# Patient Record
Sex: MALE | Race: BLACK OR AFRICAN AMERICAN | NOT HISPANIC OR LATINO | Employment: OTHER | URBAN - METROPOLITAN AREA
[De-identification: names, ages, dates, MRNs, and addresses within clinical notes are randomized per-mention and may not be internally consistent; named-entity substitution may affect disease eponyms.]

---

## 2024-06-20 ENCOUNTER — OFFICE VISIT (OUTPATIENT)
Dept: URGENT CARE | Facility: URGENT CARE | Age: 19
End: 2024-06-20
Payer: COMMERCIAL

## 2024-06-20 ENCOUNTER — ANCILLARY PROCEDURE (OUTPATIENT)
Dept: GENERAL RADIOLOGY | Facility: CLINIC | Age: 19
End: 2024-06-20
Attending: PHYSICIAN ASSISTANT
Payer: COMMERCIAL

## 2024-06-20 VITALS
WEIGHT: 262.8 LBS | HEART RATE: 97 BPM | DIASTOLIC BLOOD PRESSURE: 84 MMHG | TEMPERATURE: 97 F | SYSTOLIC BLOOD PRESSURE: 131 MMHG | OXYGEN SATURATION: 97 %

## 2024-06-20 DIAGNOSIS — R06.02 SHORTNESS OF BREATH: ICD-10-CM

## 2024-06-20 DIAGNOSIS — J22 LRTI (LOWER RESPIRATORY TRACT INFECTION): Primary | ICD-10-CM

## 2024-06-20 PROCEDURE — 99204 OFFICE O/P NEW MOD 45 MIN: CPT | Performed by: PHYSICIAN ASSISTANT

## 2024-06-20 PROCEDURE — 71046 X-RAY EXAM CHEST 2 VIEWS: CPT | Mod: TC | Performed by: RADIOLOGY

## 2024-06-20 RX ORDER — AZITHROMYCIN 250 MG/1
TABLET, FILM COATED ORAL
Qty: 6 TABLET | Refills: 0 | Status: SHIPPED | OUTPATIENT
Start: 2024-06-20 | End: 2024-06-25

## 2024-06-20 RX ORDER — PREDNISONE 20 MG/1
20 TABLET ORAL 2 TIMES DAILY
Qty: 10 TABLET | Refills: 0 | Status: SHIPPED | OUTPATIENT
Start: 2024-06-20 | End: 2024-06-25

## 2024-06-20 NOTE — LETTER
June 20, 2024      Colton Subarmanian Jr.  [address]   [facility] [id number]        To Whom It May Concern:    Colton Subramanian Jr.  was seen on 6/20/24.  Please excuse him for the next 1-3 days due to illness.        Sincerely,        Vijay Santo PA-C

## 2024-06-20 NOTE — PROGRESS NOTES
LRTI (lower respiratory tract infection)  - XR Chest 2 Views  - azithromycin (ZITHROMAX) 250 MG tablet; Take 2 tablets (500 mg) by mouth daily for 1 day, THEN 1 tablet (250 mg) daily for 4 days.  - predniSONE (DELTASONE) 20 MG tablet; Take 1 tablet (20 mg) by mouth 2 times daily for 5 days    Shortness of breath  - XR Chest 2 Views  - azithromycin (ZITHROMAX) 250 MG tablet; Take 2 tablets (500 mg) by mouth daily for 1 day, THEN 1 tablet (250 mg) daily for 4 days.  - predniSONE (DELTASONE) 20 MG tablet; Take 1 tablet (20 mg) by mouth 2 times daily for 5 days    General Tips for All Ages:    Rest:  Why: Allows your body to focus on healing.  What to Do:  Get plenty of rest and avoid overexertion.    Hydration:  Why: Helps thin mucus and soothes the respiratory tract.  What to Do:  Drink ample fluids, including water, herbal teas, and clear broths.    OTC Cough Suppressants (if recommended):  Why: Eases coughing and promotes rest.  What to Do:  Use over-the-counter cough suppressants as directed.    For Children (Under 12 Years):    OTC Pediatric Cough Medications (if approved by provider):  Why: Manages cough symptoms.  What to Do:  Administer pediatric cough medications as recommended by your child's pediatrician.    Humidifier Use:  Why: Adds moisture to the air, easing breathing.  What to Do:  Use a humidifier in your child's room, especially during sleep.    For Adolescents (12-17 Years):    OTC Cough Suppressants:  Why: Eases persistent coughing.  What to Do:  Take over-the-counter cough suppressants as directed.    Stay Active:  Why: Gentle exercise can aid in recovery.  What to Do:  Engage in light physical activity as tolerated.    For Adults (18 Years and Older):    OTC Cough Suppressants:  Why: Eases coughing for better rest.  What to Do:  Use over-the-counter cough suppressants as directed.    NSAIDs (Nonsteroidal Anti-Inflammatory Drugs):  Why: Reduces inflammation and alleviates discomfort.  What to  Do:  Take NSAIDs like ibuprofen as directed.    All Ages:    Warm Salt Gargle:  Why: Soothes a sore throat.  What to Do:  Gargle with warm salt water several times a day.    Deep Breathing Exercises:  Why: Helps clear mucus and improve lung function.  What to Do:  Practice deep breathing exercises regularly.    Nutrient-Rich Diet:  Why: Supports overall health and recovery.  What to Do:  Consume a balanced diet with fruits, vegetables, and protein.    Avoid Smoke and Irritants:  Why: Prevents respiratory irritation.  What to Do:  Steer clear of smoke, strong odors, and environmental irritants.    Follow-Up Care:    Patient advised to return to clinic for reevaluation (either UC or PCP) if symptoms do not improve in 5 days. Patient educated on red flag symptoms and asked to go directly to the ED if these symptoms present themselves.     Seek Medical Attention:    When: If symptoms persist or worsen.  What to Do:  Consult with your healthcare provider if your symptoms don't improve or if you experience difficulty breathing.  Remember, lower respiratory infections may take time to fully resolve. Follow these guidelines, and if you have any concerns or need personalized advice, don't hesitate to contact your healthcare provider.    Wishing you a speedy recovery!      Vijay Santo PA-C  [facility]    Subjective   18 year old who presents to clinic today for the following health issues:    Urgent Care       HPI     Acute Illness  Acute illness concerns: Pt presents with sob, burning sensation, wheezing, chills and cough, feels light headed when he gets up X couple weeks. Pt has a hx of pneumonia/was born with pneumonia.  Symptoms:  Fever: No  Chills/Sweats: No  Headache (location?): No  Sinus Pressure: No  Conjunctivitis:  No  Ear Pain: no  Rhinorrhea: YES  Congestion: YES  Sore Throat: YES  Cough: YES  Wheeze: YES  Decreased Appetite: YES  Nausea: YES  Vomiting: YES  Diarrhea: No  Dysuria/Freq.:  No  Dysuria or Hematuria: No  Fatigue/Achiness: YES  Sick/Strep Exposure: No      Review of Systems   Review of Systems   See HPI    Objective    Temp: 97  F (36.1  C) Temp src: Tympanic BP: 131/84 Pulse: 97     SpO2: 97 %       Physical Exam   Physical Exam  Constitutional:       General: He is not in acute distress.     Appearance: Normal appearance. He is normal weight. He is not ill-appearing, toxic-appearing or diaphoretic.   HENT:      Head: Normocephalic and atraumatic.      Right Ear: Tympanic membrane, ear canal and external ear normal. There is no impacted cerumen.      Left Ear: Tympanic membrane, ear canal and external ear normal. There is no impacted cerumen.      Nose: Congestion and rhinorrhea present.      Mouth/Throat:      Pharynx: No oropharyngeal exudate or posterior oropharyngeal erythema.   Cardiovascular:      Rate and Rhythm: Normal rate and regular rhythm.      Pulses: Normal pulses.      Heart sounds: Normal heart sounds. No murmur heard.     No friction rub. No gallop.   Pulmonary:      Effort: Pulmonary effort is normal. No respiratory distress.      Breath sounds: No stridor. Wheezing present. No rhonchi or rales.   Chest:      Chest wall: No tenderness.   Lymphadenopathy:      Cervical: No cervical adenopathy.   Neurological:      Mental Status: He is alert.   Psychiatric:         Mood and Affect: Mood normal.         Behavior: Behavior normal.         Thought Content: Thought content normal.         Judgment: Judgment normal.          No results found for this or any previous visit (from the past 24 hour(s)).        Results for orders placed or performed in visit on 06/20/24   XR Chest 2 Views     Status: None    Narrative    EXAM: XR CHEST 2 VIEWS  LOCATION: [facility]  DATE: 6/20/2024    INDICATION:  LRTI (lower respiratory tract infection), Shortness of breath  COMPARISON: None.      Impression    IMPRESSION: Negative chest.